# Patient Record
Sex: MALE | Race: OTHER
[De-identification: names, ages, dates, MRNs, and addresses within clinical notes are randomized per-mention and may not be internally consistent; named-entity substitution may affect disease eponyms.]

---

## 2020-04-23 ENCOUNTER — HOSPITAL ENCOUNTER (EMERGENCY)
Dept: HOSPITAL 12 - ER | Age: 45
Discharge: HOME | End: 2020-04-23
Payer: MEDICAID

## 2020-04-23 VITALS — HEIGHT: 69 IN | BODY MASS INDEX: 25.18 KG/M2 | WEIGHT: 170 LBS

## 2020-04-23 VITALS — DIASTOLIC BLOOD PRESSURE: 79 MMHG | SYSTOLIC BLOOD PRESSURE: 123 MMHG
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
## 2020-04-23 DIAGNOSIS — S43.102A: ICD-10-CM

## 2020-04-23 DIAGNOSIS — S42.032A: Primary | ICD-10-CM

## 2020-04-23 PROCEDURE — 2W39X1Z IMMOBILIZATION OF LEFT UPPER EXTREMITY USING SPLINT: ICD-10-PCS | Performed by: EMERGENCY MEDICINE

## 2020-04-23 PROCEDURE — A4663 DIALYSIS BLOOD PRESSURE CUFF: HCPCS

## 2020-04-23 NOTE — NUR
Patient given written and verbal discharge instructions.  Patient verbalizes 
understanding of instructions.  Patient is ambulatory with steady gait.  
Refuses offer of shelter placement.  Patient given list of available shelters 
in surrounding area. Patient will arrange own transportation to shelter. 
refused all other services at this time.